# Patient Record
Sex: MALE | Race: WHITE | NOT HISPANIC OR LATINO | URBAN - METROPOLITAN AREA
[De-identification: names, ages, dates, MRNs, and addresses within clinical notes are randomized per-mention and may not be internally consistent; named-entity substitution may affect disease eponyms.]

---

## 2021-01-01 ENCOUNTER — EMERGENCY ROOM – HOSPITAL (OUTPATIENT)
Dept: URBAN - METROPOLITAN AREA HOSPITAL 96 | Facility: HOSPITAL | Age: 36
End: 2021-01-01
Payer: COMMERCIAL

## 2021-01-01 ENCOUNTER — OFFICE (OUTPATIENT)
Dept: URBAN - METROPOLITAN AREA CLINIC 11 | Facility: CLINIC | Age: 36
End: 2021-01-01
Payer: COMMERCIAL

## 2021-01-01 ENCOUNTER — AMBULATORY SURGICAL CENTER (OUTPATIENT)
Dept: URBAN - METROPOLITAN AREA SURGERY 3 | Facility: SURGERY | Age: 36
End: 2021-01-01
Payer: COMMERCIAL

## 2021-01-01 ENCOUNTER — INPATIENT HOSPITAL (OUTPATIENT)
Dept: URBAN - METROPOLITAN AREA HOSPITAL 95 | Facility: HOSPITAL | Age: 36
End: 2021-01-01
Payer: COMMERCIAL

## 2021-01-01 ENCOUNTER — OFFICE (OUTPATIENT)
Dept: URBAN - METROPOLITAN AREA PATHOLOGY 22 | Facility: PATHOLOGY | Age: 36
End: 2021-01-01
Payer: COMMERCIAL

## 2021-01-01 VITALS
SYSTOLIC BLOOD PRESSURE: 132 MMHG | SYSTOLIC BLOOD PRESSURE: 135 MMHG | DIASTOLIC BLOOD PRESSURE: 84 MMHG | HEART RATE: 86 BPM | TEMPERATURE: 97.3 F | RESPIRATION RATE: 18 BRPM | HEART RATE: 96 BPM | HEART RATE: 84 BPM | HEART RATE: 95 BPM | SYSTOLIC BLOOD PRESSURE: 134 MMHG | TEMPERATURE: 97.5 F | RESPIRATION RATE: 17 BRPM | TEMPERATURE: 97.5 F | OXYGEN SATURATION: 97 % | OXYGEN SATURATION: 98 % | WEIGHT: 220 LBS | DIASTOLIC BLOOD PRESSURE: 90 MMHG | HEIGHT: 77 IN | DIASTOLIC BLOOD PRESSURE: 89 MMHG | DIASTOLIC BLOOD PRESSURE: 77 MMHG | SYSTOLIC BLOOD PRESSURE: 135 MMHG | RESPIRATION RATE: 16 BRPM | DIASTOLIC BLOOD PRESSURE: 77 MMHG | RESPIRATION RATE: 16 BRPM | HEIGHT: 77 IN | DIASTOLIC BLOOD PRESSURE: 80 MMHG | TEMPERATURE: 97.3 F | HEART RATE: 86 BPM | DIASTOLIC BLOOD PRESSURE: 84 MMHG | SYSTOLIC BLOOD PRESSURE: 132 MMHG | OXYGEN SATURATION: 97 % | OXYGEN SATURATION: 100 % | SYSTOLIC BLOOD PRESSURE: 128 MMHG | DIASTOLIC BLOOD PRESSURE: 90 MMHG | SYSTOLIC BLOOD PRESSURE: 135 MMHG | HEART RATE: 95 BPM | OXYGEN SATURATION: 97 % | DIASTOLIC BLOOD PRESSURE: 90 MMHG | DIASTOLIC BLOOD PRESSURE: 80 MMHG | RESPIRATION RATE: 18 BRPM | TEMPERATURE: 97.3 F | DIASTOLIC BLOOD PRESSURE: 89 MMHG | OXYGEN SATURATION: 100 % | OXYGEN SATURATION: 98 % | HEART RATE: 86 BPM | RESPIRATION RATE: 18 BRPM | RESPIRATION RATE: 17 BRPM | SYSTOLIC BLOOD PRESSURE: 132 MMHG | HEIGHT: 77 IN | HEART RATE: 96 BPM | WEIGHT: 220 LBS | WEIGHT: 220 LBS | SYSTOLIC BLOOD PRESSURE: 134 MMHG | OXYGEN SATURATION: 98 % | HEART RATE: 95 BPM | OXYGEN SATURATION: 100 % | DIASTOLIC BLOOD PRESSURE: 84 MMHG | SYSTOLIC BLOOD PRESSURE: 128 MMHG | RESPIRATION RATE: 16 BRPM | TEMPERATURE: 97.5 F | SYSTOLIC BLOOD PRESSURE: 134 MMHG | SYSTOLIC BLOOD PRESSURE: 128 MMHG | HEART RATE: 96 BPM | HEART RATE: 84 BPM | DIASTOLIC BLOOD PRESSURE: 80 MMHG | RESPIRATION RATE: 17 BRPM | DIASTOLIC BLOOD PRESSURE: 89 MMHG | DIASTOLIC BLOOD PRESSURE: 77 MMHG | HEART RATE: 84 BPM

## 2021-01-01 VITALS
HEART RATE: 88 BPM | OXYGEN SATURATION: 99 % | HEIGHT: 77 IN | SYSTOLIC BLOOD PRESSURE: 144 MMHG | DIASTOLIC BLOOD PRESSURE: 91 MMHG | WEIGHT: 229 LBS

## 2021-01-01 VITALS
HEIGHT: 77 IN | DIASTOLIC BLOOD PRESSURE: 89 MMHG | SYSTOLIC BLOOD PRESSURE: 149 MMHG | OXYGEN SATURATION: 99 % | HEART RATE: 96 BPM | WEIGHT: 220 LBS

## 2021-01-01 VITALS
WEIGHT: 231 LBS | DIASTOLIC BLOOD PRESSURE: 73 MMHG | HEART RATE: 88 BPM | HEIGHT: 77 IN | OXYGEN SATURATION: 100 % | SYSTOLIC BLOOD PRESSURE: 129 MMHG

## 2021-01-01 DIAGNOSIS — R93.5 ABNORMAL FINDINGS ON DIAGNOSTIC IMAGING OF OTHER ABDOMINAL R: ICD-10-CM

## 2021-01-01 DIAGNOSIS — K22.8 OTHER SPECIFIED DISEASES OF ESOPHAGUS: ICD-10-CM

## 2021-01-01 DIAGNOSIS — K92.0 HEMATEMESIS: ICD-10-CM

## 2021-01-01 DIAGNOSIS — K31.89 OTHER DISEASES OF STOMACH AND DUODENUM: ICD-10-CM

## 2021-01-01 DIAGNOSIS — K70.10 ALCOHOLIC HEPATITIS WITHOUT ASCITES: ICD-10-CM

## 2021-01-01 DIAGNOSIS — I85.00 ESOPHAGEAL VARICES WITHOUT BLEEDING: ICD-10-CM

## 2021-01-01 DIAGNOSIS — K21.9 GASTRO-ESOPHAGEAL REFLUX DISEASE WITHOUT ESOPHAGITIS: ICD-10-CM

## 2021-01-01 DIAGNOSIS — R74.8 ABNORMAL LEVELS OF OTHER SERUM ENZYMES: ICD-10-CM

## 2021-01-01 DIAGNOSIS — K92.2 GASTROINTESTINAL HEMORRHAGE, UNSPECIFIED: ICD-10-CM

## 2021-01-01 DIAGNOSIS — I85.01 ESOPHAGEAL VARICES WITH BLEEDING: ICD-10-CM

## 2021-01-01 LAB
CBC, PLATELET, NO DIFFERENTIAL: HEMATOCRIT: 26.5 % — LOW (ref 37.5–51)
CBC, PLATELET, NO DIFFERENTIAL: HEMATOCRIT: 29.8 % — LOW (ref 37.5–51)
CBC, PLATELET, NO DIFFERENTIAL: HEMATOCRIT: 32.3 % — LOW (ref 37.5–51)
CBC, PLATELET, NO DIFFERENTIAL: HEMOGLOBIN: 10.9 G/DL — LOW (ref 13–17.7)
CBC, PLATELET, NO DIFFERENTIAL: HEMOGLOBIN: 8.6 G/DL — LOW (ref 13–17.7)
CBC, PLATELET, NO DIFFERENTIAL: HEMOGLOBIN: 9.9 G/DL — LOW (ref 13–17.7)
CBC, PLATELET, NO DIFFERENTIAL: MCH: 29.2 PG (ref 26.6–33)
CBC, PLATELET, NO DIFFERENTIAL: MCH: 31.9 PG (ref 26.6–33)
CBC, PLATELET, NO DIFFERENTIAL: MCH: 33.3 PG — HIGH (ref 26.6–33)
CBC, PLATELET, NO DIFFERENTIAL: MCHC: 32.5 G/DL (ref 31.5–35.7)
CBC, PLATELET, NO DIFFERENTIAL: MCHC: 33.2 G/DL (ref 31.5–35.7)
CBC, PLATELET, NO DIFFERENTIAL: MCHC: 33.7 G/DL (ref 31.5–35.7)
CBC, PLATELET, NO DIFFERENTIAL: MCV: 100 FL — HIGH (ref 79–97)
CBC, PLATELET, NO DIFFERENTIAL: MCV: 90 FL (ref 79–97)
CBC, PLATELET, NO DIFFERENTIAL: MCV: 94 FL (ref 79–97)
CBC, PLATELET, NO DIFFERENTIAL: PLATELETS: 128 X10E3/UL — LOW (ref 150–450)
CBC, PLATELET, NO DIFFERENTIAL: PLATELETS: 208 X10E3/UL (ref 150–450)
CBC, PLATELET, NO DIFFERENTIAL: PLATELETS: 41 X10E3/UL — CRITICAL LOW (ref 150–450)
CBC, PLATELET, NO DIFFERENTIAL: RBC: 2.95 X10E6/UL — LOW (ref 4.14–5.8)
CBC, PLATELET, NO DIFFERENTIAL: RBC: 2.97 X10E6/UL — LOW (ref 4.14–5.8)
CBC, PLATELET, NO DIFFERENTIAL: RBC: 3.42 X10E6/UL — LOW (ref 4.14–5.8)
CBC, PLATELET, NO DIFFERENTIAL: RDW: 13 % (ref 11.6–15.4)
CBC, PLATELET, NO DIFFERENTIAL: RDW: 14.1 % (ref 11.6–15.4)
CBC, PLATELET, NO DIFFERENTIAL: RDW: 15.4 % (ref 11.6–15.4)
CBC, PLATELET, NO DIFFERENTIAL: WBC: 3.3 X10E3/UL — LOW (ref 3.4–10.8)
CBC, PLATELET, NO DIFFERENTIAL: WBC: 3.4 X10E3/UL (ref 3.4–10.8)
CBC, PLATELET, NO DIFFERENTIAL: WBC: 7.2 X10E3/UL (ref 3.4–10.8)
COMP. METABOLIC PANEL (14): A/G RATIO: 1 — LOW (ref 1.2–2.2)
COMP. METABOLIC PANEL (14): A/G RATIO: 1.2 (ref 1.2–2.2)
COMP. METABOLIC PANEL (14): ALBUMIN: 4.3 G/DL (ref 4–5)
COMP. METABOLIC PANEL (14): ALBUMIN: 4.6 G/DL (ref 4–5)
COMP. METABOLIC PANEL (14): ALKALINE PHOSPHATASE: 114 IU/L (ref 39–117)
COMP. METABOLIC PANEL (14): ALKALINE PHOSPHATASE: 182 IU/L — HIGH (ref 44–121)
COMP. METABOLIC PANEL (14): ALT (SGPT): 37 IU/L (ref 0–44)
COMP. METABOLIC PANEL (14): ALT (SGPT): 43 IU/L (ref 0–44)
COMP. METABOLIC PANEL (14): AST (SGOT): 166 IU/L — HIGH (ref 0–40)
COMP. METABOLIC PANEL (14): AST (SGOT): 95 IU/L — HIGH (ref 0–40)
COMP. METABOLIC PANEL (14): BILIRUBIN, TOTAL: 2.7 MG/DL — HIGH (ref 0–1.2)
COMP. METABOLIC PANEL (14): BILIRUBIN, TOTAL: 3.4 MG/DL — HIGH (ref 0–1.2)
COMP. METABOLIC PANEL (14): BUN/CREATININE RATIO: 9 (ref 9–20)
COMP. METABOLIC PANEL (14): BUN/CREATININE RATIO: 9 (ref 9–20)
COMP. METABOLIC PANEL (14): BUN: 6 MG/DL (ref 6–20)
COMP. METABOLIC PANEL (14): BUN: 6 MG/DL (ref 6–20)
COMP. METABOLIC PANEL (14): CALCIUM: 9.4 MG/DL (ref 8.7–10.2)
COMP. METABOLIC PANEL (14): CALCIUM: 9.4 MG/DL (ref 8.7–10.2)
COMP. METABOLIC PANEL (14): CARBON DIOXIDE, TOTAL: 24 MMOL/L (ref 20–29)
COMP. METABOLIC PANEL (14): CARBON DIOXIDE, TOTAL: 24 MMOL/L (ref 20–29)
COMP. METABOLIC PANEL (14): CHLORIDE: 101 MMOL/L (ref 96–106)
COMP. METABOLIC PANEL (14): CHLORIDE: 95 MMOL/L — LOW (ref 96–106)
COMP. METABOLIC PANEL (14): CREATININE: 0.69 MG/DL — LOW (ref 0.76–1.27)
COMP. METABOLIC PANEL (14): CREATININE: 0.7 MG/DL — LOW (ref 0.76–1.27)
COMP. METABOLIC PANEL (14): EGFR IF AFRICN AM: 141 ML/MIN/1.73 (ref 59–?)
COMP. METABOLIC PANEL (14): EGFR IF AFRICN AM: 141 ML/MIN/1.73 (ref 59–?)
COMP. METABOLIC PANEL (14): EGFR IF NONAFRICN AM: 122 ML/MIN/1.73 (ref 59–?)
COMP. METABOLIC PANEL (14): EGFR IF NONAFRICN AM: 122 ML/MIN/1.73 (ref 59–?)
COMP. METABOLIC PANEL (14): GLOBULIN, TOTAL: 3.6 G/DL (ref 1.5–4.5)
COMP. METABOLIC PANEL (14): GLOBULIN, TOTAL: 4.5 G/DL (ref 1.5–4.5)
COMP. METABOLIC PANEL (14): GLUCOSE: 122 MG/DL — HIGH (ref 65–99)
COMP. METABOLIC PANEL (14): GLUCOSE: 99 MG/DL (ref 65–99)
COMP. METABOLIC PANEL (14): POTASSIUM: 3.8 MMOL/L (ref 3.5–5.2)
COMP. METABOLIC PANEL (14): POTASSIUM: 4.3 MMOL/L (ref 3.5–5.2)
COMP. METABOLIC PANEL (14): PROTEIN, TOTAL: 7.9 G/DL (ref 6–8.5)
COMP. METABOLIC PANEL (14): PROTEIN, TOTAL: 9.1 G/DL — HIGH (ref 6–8.5)
COMP. METABOLIC PANEL (14): SODIUM: 136 MMOL/L (ref 134–144)
COMP. METABOLIC PANEL (14): SODIUM: 138 MMOL/L (ref 134–144)
HEMATOLOGY COMMENTS: (no result)
HEPATIC FUNCTION PANEL (7): ALBUMIN: 4.4 G/DL (ref 4–5)
HEPATIC FUNCTION PANEL (7): ALKALINE PHOSPHATASE: 116 IU/L (ref 48–121)
HEPATIC FUNCTION PANEL (7): ALT (SGPT): 21 IU/L (ref 0–44)
HEPATIC FUNCTION PANEL (7): AST (SGOT): 51 IU/L — HIGH (ref 0–40)
HEPATIC FUNCTION PANEL (7): BILIRUBIN, DIRECT: 0.71 MG/DL — HIGH (ref 0–0.4)
HEPATIC FUNCTION PANEL (7): BILIRUBIN, TOTAL: 1.7 MG/DL — HIGH (ref 0–1.2)
HEPATIC FUNCTION PANEL (7): PROTEIN, TOTAL: 8.2 G/DL (ref 6–8.5)
PROTHROMBIN TIME (PT): INR: 1.2 (ref 0.9–1.2)
PROTHROMBIN TIME (PT): INR: 1.3 — HIGH (ref 0.9–1.2)
PROTHROMBIN TIME (PT): PROTHROMBIN TIME: 12.8 SEC — HIGH (ref 9.1–12)
PROTHROMBIN TIME (PT): PROTHROMBIN TIME: 13.2 SEC — HIGH (ref 9.1–12)

## 2021-01-01 PROCEDURE — 43239 EGD BIOPSY SINGLE/MULTIPLE: CPT | Performed by: INTERNAL MEDICINE

## 2021-01-01 PROCEDURE — 43244 EGD VARICES LIGATION: CPT | Performed by: INTERNAL MEDICINE

## 2021-01-01 PROCEDURE — 88342 IMHCHEM/IMCYTCHM 1ST ANTB: CPT | Performed by: INTERNAL MEDICINE

## 2021-01-01 PROCEDURE — 88313 SPECIAL STAINS GROUP 2: CPT | Performed by: INTERNAL MEDICINE

## 2021-01-01 PROCEDURE — 88305 TISSUE EXAM BY PATHOLOGIST: CPT | Performed by: INTERNAL MEDICINE

## 2021-01-01 PROCEDURE — 99222 1ST HOSP IP/OBS MODERATE 55: CPT | Performed by: INTERNAL MEDICINE

## 2021-01-01 PROCEDURE — 99214 OFFICE O/P EST MOD 30 MIN: CPT | Performed by: INTERNAL MEDICINE

## 2021-01-01 PROCEDURE — 99214 OFFICE O/P EST MOD 30 MIN: CPT | Performed by: NURSE PRACTITIONER

## 2021-01-01 RX ORDER — PANTOPRAZOLE SODIUM 40 MG/1
40 TABLET, DELAYED RELEASE ORAL
Qty: 90 | Refills: 3 | Status: ACTIVE
Start: 2021-01-01

## 2021-04-23 NOTE — SERVICEHPINOTES
Mr. Woo presents today for hospital follow up. He was admitted to the hospital on last Thursday. He was vomiting blood several times since last Sunday night. Emesis was dark red liquid. He notes that he would vomit every time he tried to eat. Associated symptoms include dizziness and large amounts of bruising. He notes before the vomiting started, he had drank all day on Saturday in [place] with friends. He notes he received 2 units of blood and 2 buttons of platelets while in the hospital EGD with varices present and placement of 4 bands. He was placed on Pantoprazole every day when he was discharged. He denies retrosternal burning, acid reflux, dysphagia, and epigastric pain. He had a CT while in the hospital with some evidence of cirrhosis. He denies darkening of the urine. He notes that he did notice that he had yellowing of the eyes and skin that began last Monday, but it has began to slowly improve. He denies confusion and change in his sleep wake cycle. He notes he did notice red pin point areas on his palms over the past few days. He notes that he does drink regularly several times per week, but since he was discharged from the hospital he has not had any further alcohol.  BRBili 4.3, , ALP 89, ALT 46, Lipase 234, Hct 25.2, Hgb 8.6, RBC 2.41, Platelet 89, INR 1.6He notes he has seen his PCP and had more labs and an ultrasound done. He notes that he has not had a bowel movement since he was discharged from the hospital. He notes that he had not eaten at all last week. Before he was hospitalized he was having regular formed bowel movements. BR

## 2021-04-23 NOTE — SERVICENOTES
With his history of alcohol abuse, elevated liver enzymes, and abnormal CT imaging with indications of cirrhosis we will need a fibroscan to give definitive diagnosis of cirrhosis. MELD score as of hospitalization 17..

Attending. Pt was seen and examined with Ruby PARRA. His labs and studies were reviewed and/or requested.  We dicsussed his EOTH use (free for 2 weeks), ETOH hepatitis (typical time of improvement ETOH free, risks of cirrhosis, exam findings suggesting cirrhosis with palmar erythema, risks of progression with recurrent drinking, f/u with his counselor for tx options/depression/anxiety evaluation....).  At this point he will need f/u labs to do the MELD scoring and Maddrey scores.  We discussed his varices, risks of rebleeding and f/u EGD in abuot 2-3 weeks.

## 2021-05-10 PROBLEM — K22.8 OTHER SPECIFIED DISEASES OF ESOPHAGUS: Status: ACTIVE | Noted: 2021-01-01

## 2021-05-10 PROBLEM — K31.89 OTHER DISEASES OF STOMACH AND DUODENUM: Status: ACTIVE | Noted: 2021-01-01

## 2021-05-21 NOTE — SERVICENOTES
He has been doing quite well and has avoided all ETOH.  His varices have had a marked improvement and he has a mild portal htn gastropathy on EGD.  I would like a repeat EGD in about 6 months.  We discussed his much improved labs and f/u today. We discussed that as long has he continues etoh free, that he should have a good recovery from this event.  He will need to continue his Protonix for his GERD.

## 2021-05-21 NOTE — SERVICEHPINOTES
He presents for f/u of his ETOH hepatitis with changes of cirrhosis.  He has not had further issues with GI bleeding, abdominal pain, n/v, reflux issues, edema or such.  He has been eating well and has been ETOH free. We reviewed his labs and recent procedures. He ahs been on the Protonix with it and his diet changes he has done well.

## 2021-09-28 PROBLEM — I85.00 ESOPHAGEAL VARICES WITHOUT MENTION OF BLEEDING: Status: ACTIVE | Noted: 2021-01-01

## 2021-09-28 NOTE — SERVICENOTES
We reviewed his studies, EGD reports, ETOH hepatitis with changes suggestive of cirrhosis and the need to be completely ETOH free.  He is still going to counseling and I suggest that he continue this as well (divorce and etoh).  He will need his f/u u/s and EGD with the hx of varices and cirrhosis.

## 2021-09-28 NOTE — SERVICEHPINOTES
He presents for f/u of his hx of etoh hepatitis with changes of cirrhosis and varices.  He has not had particular issues of late.  However, he has had beers on the weekends with football.  He has not had icterus, n/v, diarrhea, melena, GI bleeding or other new issues.  He has not had ascites or edema.  He has been eating better overall and starting to exercise and play hockey.FONT style="BACKGROUND-COLOR: #ffffcc" visited="true"He had his last EGD in May and his varices had improved.  His last LFTS were suggestive of Couch./FONT